# Patient Record
Sex: MALE | Race: BLACK OR AFRICAN AMERICAN | URBAN - METROPOLITAN AREA
[De-identification: names, ages, dates, MRNs, and addresses within clinical notes are randomized per-mention and may not be internally consistent; named-entity substitution may affect disease eponyms.]

---

## 2022-11-09 NOTE — PROGRESS NOTES
Mago Pelayo is a 33 y.o. male is seen on 11/11/2022 for Establish Care, Hypertension, Diabetes, Hypothyroidism, and Other (schizophrenia)    Assessment & Plan:     1. Tachycardia  Assessment & Plan:  Asymptomatic, ordered EKG to r/o arrhythmia  Advised pt on smoking cessation  Orders:  -     EKG, 12 LEAD, INITIAL; Future  2. Essential hypertension  Assessment & Plan:  BP acceptable, goal <130/80, continue regimen  Orders:  -     CBC WITH AUTOMATED DIFF; Future  -     METABOLIC PANEL, COMPREHENSIVE; Future  3. Type 2 diabetes mellitus with other specified complication, with long-term current use of insulin (HCC)  Assessment & Plan:  Continue current regimen, recheck a1c  Orders:  -     CBC WITH AUTOMATED DIFF; Future  -     METABOLIC PANEL, COMPREHENSIVE; Future  -     HEMOGLOBIN A1C WITH EAG; Future  4. JANET (obstructive sleep apnea)  Assessment & Plan:  Advised pt to improve compliance with CPAP machine, continue management per pulmonary  5. Acquired hypothyroidism  Assessment & Plan:  Continue current regimen, reheck TSH & T4  Orders:  -     TSH 3RD GENERATION; Future  -     T4, FREE; Future  6. Schizophrenia, unspecified type [facility])  Assessment & Plan:  Continue current regimen per [facility] Service Board  Orders:  -     TSH 3RD GENERATION; Future  7. Need for hepatitis C screening test  -     HEPATITIS C AB; Future  8. Screening for lipid disorders  -     LIPID PANEL; Future  9. Encounter to establish care    Follow-up and Dispositions    Return in about 4 weeks (around 12/9/2022) for physical, blood pressure, hypothyroidism, lab results. Subjective:     HPI    Patient presents today with his caregiver, Shaun Cruz.      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, caregiver's , and 5 other individuals  Alcohol Use- none currently  Recreational Drug Use- none  Tobacco Use- about 10 cigarettes a day    Family Hx:  HTN- mother  Diabetes- aunt  HLD- none  MI- none  Stroke- none  Cancer- none  Mental Health Disorder- cousin (schizophrenia)  Autoimmune Disease - none    Preventive:  COVID-19 vac - received, due for booster  Flu vaccine- none  Tetanus vac - none  Pneumonia vaccine- none  MyChart activation - sent text    Medical History/Health Concerns:    Tachycardia  Chest pain: no  Dizziness: no  Shortness of breath: no  Palpitations: no  Hx of arrhythmia: no  Nine months of high heart rate  Does not drink coffee anymore, was drinking 4-5 cups a day  Smokes cigarettes a 10 day  Seen by cardiology: no    Hypertension-  Symptoms:  none  BP readings at home are: 638'K-489'X systolic, 65'B to 96'Z diastolic   Comorbid: HLD, DM  Current treatment: amlodipine 10 mg daily, lisinopril 10 mg daily, clonidine 0.1 mg PRN - has not had to use clonidine    Type 2 DM-  Home BG readings range from : 100's to 500's  Hypoglycemia: none  On statin: none  Comorbid: HTN, HLD  Followed by endocrine: no  Current treatment: insulin lantus 14 units at bedtime, metformin 1,000 mg BID, insulin humalog sliding scale  Ran out of metformin  Last a1c 6.4 on 6/2/22    JANET  Has a CPAP machine  Noncompliant - caregiver tries to educate pt to wear CPAP  Follows pulmonology    Hypothyroidism-  Compliant with meds, takes it in the mornings on empty stomach: yes  Denies any weight/skin/hair changes, constipation/diarrhea, palpitations, heat/cold intolerance  Treatment:  levothyroxine 75 mcg daily  Last took all his pills 2 weeks ago    Schizophrenia  Treatment: Ziprasidone 20 mg daily (60 mg at night, 20 during the day), Haldol nightly, melatonin nightly  Followed by psychiatry: yes, [facility] doing all of his psych meds    Review of Systems   Constitutional:  Negative for chills, fever and malaise/fatigue. Respiratory:  Negative for shortness of breath. Cardiovascular:  Negative for chest pain and palpitations.    Neurological:  Negative for dizziness and headaches. Objective:   /88 (BP 1 Location: Right upper arm, BP Patient Position: Sitting, BP Cuff Size: Large adult)   Pulse (!) 102   Temp 98.9 °F (37.2 °C) (Oral)   Resp 16   Ht 5' 5\" (1.651 m)   Wt 223 lb 9.6 oz (101.4 kg)   SpO2 96%   BMI 37.21 kg/m²     Physical Exam  Vitals and nursing note reviewed. Constitutional:       Appearance: Normal appearance. HENT:      Head: Normocephalic and atraumatic. Cardiovascular:      Rate and Rhythm: Regular rhythm. Tachycardia present. Heart sounds: No murmur heard. No gallop. Pulmonary:      Effort: Pulmonary effort is normal. No respiratory distress. Breath sounds: No wheezing, rhonchi or rales. Musculoskeletal:         General: Normal range of motion. Skin:     General: Skin is warm and dry. Neurological:      General: No focal deficit present. Mental Status: He is alert and oriented to person, place, and time. Psychiatric:         Mood and Affect: Mood normal.         Thought Content:  Thought content normal.         Judgment: Judgment normal.      DESTINY Salgado

## 2022-11-11 ENCOUNTER — OFFICE VISIT (OUTPATIENT)
Dept: FAMILY MEDICINE CLINIC | Age: 32
End: 2022-11-11
Payer: MEDICARE

## 2022-11-11 VITALS
HEART RATE: 102 BPM | TEMPERATURE: 98.9 F | BODY MASS INDEX: 37.25 KG/M2 | WEIGHT: 223.6 LBS | OXYGEN SATURATION: 96 % | HEIGHT: 65 IN | RESPIRATION RATE: 16 BRPM | DIASTOLIC BLOOD PRESSURE: 88 MMHG | SYSTOLIC BLOOD PRESSURE: 131 MMHG

## 2022-11-11 DIAGNOSIS — E11.69 TYPE 2 DIABETES MELLITUS WITH OTHER SPECIFIED COMPLICATION, WITH LONG-TERM CURRENT USE OF INSULIN (HCC): ICD-10-CM

## 2022-11-11 DIAGNOSIS — E03.9 ACQUIRED HYPOTHYROIDISM: ICD-10-CM

## 2022-11-11 DIAGNOSIS — Z13.220 SCREENING FOR LIPID DISORDERS: ICD-10-CM

## 2022-11-11 DIAGNOSIS — G47.33 OSA (OBSTRUCTIVE SLEEP APNEA): ICD-10-CM

## 2022-11-11 DIAGNOSIS — F20.9 SCHIZOPHRENIA, UNSPECIFIED TYPE (HCC): ICD-10-CM

## 2022-11-11 DIAGNOSIS — Z11.59 NEED FOR HEPATITIS C SCREENING TEST: ICD-10-CM

## 2022-11-11 DIAGNOSIS — I10 ESSENTIAL HYPERTENSION: ICD-10-CM

## 2022-11-11 DIAGNOSIS — Z79.4 TYPE 2 DIABETES MELLITUS WITH OTHER SPECIFIED COMPLICATION, WITH LONG-TERM CURRENT USE OF INSULIN (HCC): ICD-10-CM

## 2022-11-11 DIAGNOSIS — Z76.89 ENCOUNTER TO ESTABLISH CARE: ICD-10-CM

## 2022-11-11 DIAGNOSIS — R00.0 TACHYCARDIA: Primary | ICD-10-CM

## 2022-11-11 PROBLEM — E11.9 TYPE 2 DIABETES MELLITUS, WITH LONG-TERM CURRENT USE OF INSULIN (HCC): Status: ACTIVE | Noted: 2022-11-11

## 2022-11-11 PROCEDURE — 3079F DIAST BP 80-89 MM HG: CPT

## 2022-11-11 PROCEDURE — 3075F SYST BP GE 130 - 139MM HG: CPT

## 2022-11-11 PROCEDURE — 99204 OFFICE O/P NEW MOD 45 MIN: CPT

## 2022-11-11 RX ORDER — INSULIN LISPRO 100 [IU]/ML
INJECTION, SOLUTION INTRAVENOUS; SUBCUTANEOUS
Qty: 3 EACH | Refills: 0 | Status: CANCELLED
Start: 2022-11-11

## 2022-11-11 RX ORDER — LISINOPRIL 10 MG/1
10 TABLET ORAL DAILY
Qty: 30 TABLET | Refills: 0 | Status: SHIPPED | OUTPATIENT
Start: 2022-11-11

## 2022-11-11 RX ORDER — LISINOPRIL 10 MG/1
10 TABLET ORAL DAILY
COMMUNITY
End: 2022-11-11 | Stop reason: SDUPTHER

## 2022-11-11 RX ORDER — ZIPRASIDONE HYDROCHLORIDE 20 MG/1
80 CAPSULE ORAL DAILY
COMMUNITY

## 2022-11-11 RX ORDER — METFORMIN HYDROCHLORIDE 1000 MG/1
1000 TABLET ORAL 2 TIMES DAILY
Qty: 60 TABLET | Refills: 0 | Status: SHIPPED | OUTPATIENT
Start: 2022-11-11

## 2022-11-11 RX ORDER — INSULIN GLARGINE 100 [IU]/ML
14 INJECTION, SOLUTION SUBCUTANEOUS
Qty: 100 ML | Refills: 0 | Status: SHIPPED | OUTPATIENT
Start: 2022-11-11

## 2022-11-11 RX ORDER — LEVOTHYROXINE SODIUM 75 UG/1
75 TABLET ORAL DAILY
COMMUNITY
End: 2022-11-11 | Stop reason: SDUPTHER

## 2022-11-11 RX ORDER — AMLODIPINE BESYLATE 10 MG/1
10 TABLET ORAL DAILY
COMMUNITY
End: 2022-11-11 | Stop reason: SDUPTHER

## 2022-11-11 RX ORDER — MECLIZINE HYDROCHLORIDE 25 MG/1
25 TABLET ORAL DAILY
COMMUNITY

## 2022-11-11 RX ORDER — DEXTROMETHORPHAN HYDROBROMIDE, GUAIFENESIN 5; 100 MG/5ML; MG/5ML
1300 LIQUID ORAL
COMMUNITY

## 2022-11-11 RX ORDER — AMLODIPINE BESYLATE 10 MG/1
10 TABLET ORAL DAILY
Qty: 30 TABLET | Refills: 0 | Status: SHIPPED | OUTPATIENT
Start: 2022-11-11

## 2022-11-11 RX ORDER — CLONIDINE HYDROCHLORIDE 0.1 MG/1
TABLET ORAL
COMMUNITY

## 2022-11-11 RX ORDER — HALOPERIDOL 5 MG/1
5 TABLET ORAL DAILY
COMMUNITY

## 2022-11-11 RX ORDER — BISMUTH SUBSALICYLATE 262 MG/15ML
30 LIQUID ORAL
COMMUNITY

## 2022-11-11 RX ORDER — LEVOTHYROXINE SODIUM 75 UG/1
75 TABLET ORAL DAILY
Qty: 30 TABLET | Refills: 0 | Status: SHIPPED | OUTPATIENT
Start: 2022-11-11

## 2022-11-11 RX ORDER — METFORMIN HYDROCHLORIDE 1000 MG/1
1000 TABLET ORAL 2 TIMES DAILY
COMMUNITY
End: 2022-11-11 | Stop reason: SDUPTHER

## 2022-11-11 RX ORDER — INSULIN GLARGINE 100 [IU]/ML
14 INJECTION, SOLUTION SUBCUTANEOUS
COMMUNITY
End: 2022-11-11 | Stop reason: SDUPTHER

## 2022-11-11 RX ORDER — CLONIDINE HYDROCHLORIDE 0.1 MG/1
0.1 TABLET ORAL
Qty: 90 TABLET | Refills: 0 | Status: CANCELLED | OUTPATIENT
Start: 2022-11-11

## 2022-11-11 RX ORDER — INSULIN LISPRO 100 [IU]/ML
INJECTION, SOLUTION INTRAVENOUS; SUBCUTANEOUS
COMMUNITY

## 2022-11-11 RX ORDER — CHOLECALCIFEROL (VITAMIN D3) 125 MCG
5 CAPSULE ORAL
COMMUNITY

## 2022-11-11 NOTE — PROGRESS NOTES
Janae Perez presents today for   Chief Complaint   Patient presents with    Establish Care    Hypertension    Diabetes    Hypothyroidism    Other     schizophrenia       Janae Perez preferred language for health care discussion is english/other. Is someone accompanying this pt? Yes, caregiver, Thomas Lopez    Is the patient using any DME equipment during OV? no    Depression Screening:  3 most recent PHQ Screens 11/11/2022   PHQ Not Done Functional capacity motivation limits accuracy       Learning Assessment:  Learning Assessment 11/11/2022   PRIMARY LEARNER Patient   PRIMARY LANGUAGE ENGLISH   LEARNER PREFERENCE PRIMARY DEMONSTRATION   ANSWERED BY patient   RELATIONSHIP SELF       Abuse Screening:  Abuse Screening Questionnaire 11/11/2022   Do you ever feel afraid of your partner? N   Are you in a relationship with someone who physically or mentally threatens you? N   Is it safe for you to go home? Y       Generalized Anxiety  No flowsheet data found. Health Maintenance Due   Topic Date Due    Hepatitis C Screening  Never done    Depression Screen  Never done    COVID-19 Vaccine (1) Never done    Pneumococcal 0-64 years (1 - PCV) Never done    Foot Exam Q1  Never done    A1C test (Diabetic or Prediabetic)  Never done    MICROALBUMIN Q1  Never done    Eye Exam Retinal or Dilated  Never done    Lipid Screen  Never done    Hepatitis B Vaccine (1 of 3 - Risk 3-dose series) Never done    DTaP/Tdap/Td series (1 - Tdap) Never done    Flu Vaccine (1) Never done    Medicare Yearly Exam  10/19/2022   . Health Maintenance reviewed and discussed and ordered per Provider. Advance Directive:  1. Do you have an advance directive in place?  Patient Reply:no

## 2022-11-11 NOTE — LETTER
NOTIFICATION RETURN TO WORK / SCHOOL    11/11/2022 12:13 PM    Mr. Chucho Reagan  [address] [address]      To Whom It May Concern:    Chucho Reagan is currently under the care of Rafita Whitman. He will return to work/school on: 11/12/22. If there are questions or concerns please have the patient contact our office.         Sincerely,      DESTINY Calhoun

## 2022-12-12 PROBLEM — Z00.00 ANNUAL PHYSICAL EXAM: Status: ACTIVE | Noted: 2022-12-12

## 2022-12-12 NOTE — ASSESSMENT & PLAN NOTE
Continue current regimen  Instructed pt and caregiver to complete previously ordered labs ASAP  Urine microalbumin obtained in office, diabetic foot exam performed today

## 2022-12-12 NOTE — PROGRESS NOTES
Rachael Bonner is a 33 y.o. male is seen on 12/13/2022 for No chief complaint on file. Assessment & Plan:     1. Annual physical exam  Assessment & Plan:  Physical activity for a total of 150 minutes per week is recommended, drink plenty of water. Reduce CHO intake, such as white pastas, white rice, white breads. Avoid fried foods, and eat more green, leafy vegetables, whole grains, and lean proteins. Discussed recommended screenings and vaccines  2. Tachycardia  3. Essential hypertension  Assessment & Plan:  BP acceptable, goal <130/80, continue regimen    If elevated, may increase lisinopril to 20 mg daily  Re-evaluate in 4 weeks  If remains elevated, may increase lisinopril to 30 mg daily  4. Type 2 diabetes mellitus with other specified complication, with long-term current use of insulin (HCC)  Assessment & Plan:  Check a1c in office    If < 7, continue current regimen  If > 9 , referral to pharmD    5. Acquired hypothyroidism  Assessment & Plan:  Continue current regimen, check TSH and T4  6. Encounter to discuss test results  7. BMI 37.0-37.9, adult  Comments:  Advised pt on lifestyle modifications    4 weeks for lab results  Subjective:     HPI    Patient presents today with his caregiver, Katelynn Vilchis.       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 ***   Chest pain: no  Dizziness: no  Shortness of breath: no  Palpitations: no  Hx of arrhythmia: no  Nine months of high heart rate  Does not drink coffee anymore, was drinking 4-5 cups a day  Smokes cigarettes a 10 day  Seen by cardiology: no     Hypertension  Symptoms:  none  BP readings at home are: 789'Q-759'Q systolic, 04'H to 11'A diastolic   Comorbid: HLD, DM  Current treatment: amlodipine 10 mg daily, lisinopril 10 mg daily, clonidine 0.1 mg PRN - has not had to use clonidine     Type 2 DM  Home BG readings range from : 100's to 500's  Hypoglycemia: none  On statin: none  Comorbid: HTN, HLD  Followed by endocrine: no  Current treatment: insulin lantus 14 units at bedtime, metformin 1,000 mg BID, insulin humalog sliding scale  Last a1c 6.4 on 6/2/22    Hypothyroidism  Compliant with meds, takes it in the mornings on empty stomach: yes  Denies any weight/skin/hair changes, constipation/diarrhea, palpitations, heat/cold intolerance  Treatment:  levothyroxine 75 mcg daily  Last took all his pills 2 weeks ago    Review of Systems   Constitutional:  Negative for chills, fever, malaise/fatigue and weight loss. HENT:  Negative for congestion, ear pain and sore throat. Eyes:  Negative for blurred vision, pain and discharge. Respiratory:  Negative for cough and shortness of breath. Cardiovascular:  Negative for chest pain, palpitations, orthopnea and leg swelling. Gastrointestinal:  Negative for abdominal pain, blood in stool, diarrhea, nausea and vomiting. Genitourinary:  Negative for dysuria, frequency, hematuria and urgency. Musculoskeletal:  Negative for back pain, joint pain and myalgias. Skin:  Negative for rash. Neurological:  Negative for dizziness, tingling, weakness and headaches. Endo/Heme/Allergies:  Does not bruise/bleed easily. Psychiatric/Behavioral:  Negative for depression. The patient is not nervous/anxious and does not have insomnia. Objective: There were no vitals taken for this visit. No results found.     Physical Exam  Nursing note reviewed. Constitutional:       General: He is not in acute distress. Appearance: He is not ill-appearing. HENT:      Head: Normocephalic and atraumatic. Right Ear: Tympanic membrane, ear canal and external ear normal.      Left Ear: Tympanic membrane, ear canal and external ear normal.      Nose: No congestion or rhinorrhea. Mouth/Throat:      Mouth: Mucous membranes are moist.      Pharynx: Oropharynx is clear. No oropharyngeal exudate or posterior oropharyngeal erythema. Eyes:      Extraocular Movements: Extraocular movements intact. Conjunctiva/sclera: Conjunctivae normal.      Pupils: Pupils are equal, round, and reactive to light. Neck:      Vascular: No carotid bruit. Cardiovascular:      Rate and Rhythm: Normal rate and regular rhythm. Pulses: Normal pulses. Heart sounds: No murmur heard. No friction rub. No gallop. Pulmonary:      Effort: Pulmonary effort is normal. No respiratory distress. Breath sounds: No wheezing, rhonchi or rales. Abdominal:      General: Abdomen is flat. Bowel sounds are normal.      Palpations: Abdomen is soft. There is no mass. Tenderness: There is no abdominal tenderness. There is no guarding or rebound. Musculoskeletal:         General: No swelling, tenderness or deformity. Cervical back: Normal range of motion and neck supple. Right lower leg: No edema. Left lower leg: No edema. Lymphadenopathy:      Cervical: No cervical adenopathy. Skin:     General: Skin is warm and dry. Capillary Refill: Capillary refill takes less than 2 seconds. Neurological:      General: No focal deficit present. Mental Status: He is alert and oriented to person, place, and time. Sensory: No sensory deficit. Motor: No weakness. Psychiatric:         Mood and Affect: Mood normal.         Thought Content:  Thought content normal.         Judgment: Judgment normal.     DESTINY Marsh

## 2022-12-13 ENCOUNTER — OFFICE VISIT (OUTPATIENT)
Dept: FAMILY MEDICINE CLINIC | Age: 32
End: 2022-12-13
Payer: MEDICARE

## 2022-12-13 ENCOUNTER — HOSPITAL ENCOUNTER (OUTPATIENT)
Dept: LAB | Age: 32
Discharge: HOME | End: 2022-12-13
Payer: MEDICARE

## 2022-12-13 VITALS
HEIGHT: 65 IN | SYSTOLIC BLOOD PRESSURE: 124 MMHG | WEIGHT: 217.2 LBS | BODY MASS INDEX: 36.19 KG/M2 | OXYGEN SATURATION: 96 % | DIASTOLIC BLOOD PRESSURE: 87 MMHG | HEART RATE: 99 BPM | TEMPERATURE: 98.5 F | RESPIRATION RATE: 16 BRPM

## 2022-12-13 DIAGNOSIS — E11.69 TYPE 2 DIABETES MELLITUS WITH OTHER SPECIFIED COMPLICATION, WITH LONG-TERM CURRENT USE OF INSULIN (HCC): ICD-10-CM

## 2022-12-13 DIAGNOSIS — Z00.00 MEDICARE ANNUAL WELLNESS VISIT, SUBSEQUENT: Primary | ICD-10-CM

## 2022-12-13 DIAGNOSIS — Z79.4 TYPE 2 DIABETES MELLITUS WITH OTHER SPECIFIED COMPLICATION, WITH LONG-TERM CURRENT USE OF INSULIN (HCC): ICD-10-CM

## 2022-12-13 DIAGNOSIS — I10 ESSENTIAL HYPERTENSION: ICD-10-CM

## 2022-12-13 DIAGNOSIS — Z23 ENCOUNTER FOR IMMUNIZATION: ICD-10-CM

## 2022-12-13 PROBLEM — R00.0 TACHYCARDIA: Status: RESOLVED | Noted: 2022-11-11 | Resolved: 2022-12-13

## 2022-12-13 LAB
CREAT UR-MCNC: 204 MG/DL (ref 30–125)
MICROALBUMIN UR-MCNC: 2.5 MG/DL (ref 0–3)
MICROALBUMIN/CREAT UR-RTO: 12 MG/G (ref 0–30)

## 2022-12-13 PROCEDURE — 3046F HEMOGLOBIN A1C LEVEL >9.0%: CPT

## 2022-12-13 PROCEDURE — G8432 DEP SCR NOT DOC, RNG: HCPCS

## 2022-12-13 PROCEDURE — 3074F SYST BP LT 130 MM HG: CPT

## 2022-12-13 PROCEDURE — 2022F DILAT RTA XM EVC RTNOPTHY: CPT

## 2022-12-13 PROCEDURE — 82043 UR ALBUMIN QUANTITATIVE: CPT

## 2022-12-13 PROCEDURE — 99213 OFFICE O/P EST LOW 20 MIN: CPT

## 2022-12-13 PROCEDURE — G8427 DOCREV CUR MEDS BY ELIG CLIN: HCPCS

## 2022-12-13 PROCEDURE — G0439 PPPS, SUBSEQ VISIT: HCPCS

## 2022-12-13 PROCEDURE — G8417 CALC BMI ABV UP PARAM F/U: HCPCS

## 2022-12-13 PROCEDURE — 3079F DIAST BP 80-89 MM HG: CPT

## 2022-12-13 RX ORDER — METFORMIN HYDROCHLORIDE 1000 MG/1
1000 TABLET ORAL 2 TIMES DAILY
Qty: 60 TABLET | Refills: 0 | Status: SHIPPED | OUTPATIENT
Start: 2022-12-13

## 2022-12-13 RX ORDER — INSULIN LISPRO 100 [IU]/ML
INJECTION, SOLUTION INTRAVENOUS; SUBCUTANEOUS
Qty: 100 ML | Refills: 0 | Status: SHIPPED | OUTPATIENT
Start: 2022-12-13

## 2022-12-13 RX ORDER — LISINOPRIL 10 MG/1
10 TABLET ORAL DAILY
Qty: 30 TABLET | Refills: 0 | Status: SHIPPED | OUTPATIENT
Start: 2022-12-13

## 2022-12-13 RX ORDER — INSULIN GLARGINE 100 [IU]/ML
14 INJECTION, SOLUTION SUBCUTANEOUS
Qty: 100 ML | Refills: 0 | Status: SHIPPED | OUTPATIENT
Start: 2022-12-13

## 2022-12-13 RX ORDER — ZIPRASIDONE HYDROCHLORIDE 60 MG/1
1 CAPSULE ORAL
COMMUNITY
Start: 2022-12-05 | End: 2022-12-13

## 2022-12-13 RX ORDER — AMLODIPINE BESYLATE 10 MG/1
10 TABLET ORAL DAILY
Qty: 30 TABLET | Refills: 0 | Status: SHIPPED | OUTPATIENT
Start: 2022-12-13

## 2022-12-13 RX ORDER — BENZTROPINE MESYLATE 0.5 MG/1
TABLET ORAL
COMMUNITY
Start: 2022-11-09

## 2022-12-13 RX ORDER — IBUPROFEN 400 MG/1
TABLET ORAL
COMMUNITY
Start: 2022-09-05 | End: 2022-12-13 | Stop reason: ALTCHOICE

## 2022-12-13 NOTE — PROGRESS NOTES
Mila Ruiz presents today for   Chief Complaint   Patient presents with    Annual Wellness Visit     Medicare       Mila Ruiz preferred language for health care discussion is english/other. Is someone accompanying this pt? Yes, caregiver, Cathy Arellano    Is the patient using any DME equipment during OV? no    Depression Screening:  3 most recent PHQ Screens 12/13/2022   PHQ Not Done -   Little interest or pleasure in doing things Not at all   Feeling down, depressed, irritable, or hopeless Not at all   Total Score PHQ 2 0       Learning Assessment:  Learning Assessment 11/11/2022   PRIMARY LEARNER Patient   PRIMARY LANGUAGE ENGLISH   LEARNER PREFERENCE PRIMARY DEMONSTRATION   ANSWERED BY patient   RELATIONSHIP SELF       Abuse Screening:  Abuse Screening Questionnaire 11/11/2022   Do you ever feel afraid of your partner? N   Are you in a relationship with someone who physically or mentally threatens you? N   Is it safe for you to go home? Y       Generalized Anxiety  No flowsheet data found. Health Maintenance Due   Topic Date Due    Hepatitis C Screening  Never done    Depression Screen  Never done    COVID-19 Vaccine (1) Never done    Pneumococcal 0-64 years (1 - PCV) Never done    Foot Exam Q1  Never done    A1C test (Diabetic or Prediabetic)  Never done    MICROALBUMIN Q1  Never done    Eye Exam Retinal or Dilated  Never done    Lipid Screen  Never done    Hepatitis B Vaccine (1 of 3 - Risk 3-dose series) Never done    DTaP/Tdap/Td series (1 - Tdap) Never done    Medicare Yearly Exam  Never done   . Health Maintenance reviewed and discussed and ordered per Provider. Coordination of Care:  1. Have you been to the ER, urgent care clinic since your last visit? Hospitalized since your last visit? no    2. Have you seen or consulted any other health care providers outside of the [address] [place] [address] since your last visit? Include any pap smears or colon screening.  no      Advance Directive:  discussed 11/11/22

## 2022-12-13 NOTE — PATIENT INSTRUCTIONS
Medicare Wellness Visit, Male    The best way to live healthy is to have a lifestyle where you eat a well-balanced diet, exercise regularly, limit alcohol use, and quit all forms of tobacco/nicotine, if applicable. Regular preventive services are another way to keep healthy. Preventive services (vaccines, screening tests, monitoring & exams) can help personalize your care plan, which helps you manage your own care. Screening tests can find health problems at the earliest stages, when they are easiest to treat. [facility] follows the current, evidence-based guidelines published by the [facility] Rick Chris ([facility]) when recommending preventive services for our patients. Because we follow these guidelines, sometimes recommendations change over time as research supports it. (For example, a prostate screening blood test is no longer routinely recommended for men with no symptoms). Of course, you and your doctor may decide to screen more often for some diseases, based on your risk and co-morbidities (chronic disease you are already diagnosed with). Preventive services for you include:  - Medicare offers their members a free annual wellness visit, which is time for you and your primary care provider to discuss and plan for your preventive service needs.  Take advantage of this benefit every year!    -Over the age of 72 should receive the recommended pneumonia vaccines.   -All adults should have a flu vaccine yearly.  -All adults should receive a tetanus vaccine every 10 years.  -Over the age of 48 should receive the shingles vaccines.    -All adults should be screened once for Hepatitis C.  -All adults age 38-68 who are overweight should have a diabetes screening test once every three years.   -Other screening tests & preventive services for persons with diabetes include: an eye exam to screen for diabetic retinopathy, a kidney function test, a foot exam, and stricter control over your cholesterol.   -Cardiovascular screening for adults with routine risk involves an electrocardiogram (ECG) at intervals determined by the provider.     -Colorectal cancer screening should be done for adults age 43-69 with no increased risk factors for colorectal cancer. There are a number of acceptable methods of screening for this type of cancer. Each test has its own benefits and drawbacks. Discuss with your provider what is most appropriate for you during your annual wellness visit. The different tests include: colonoscopy (considered the best screening method), a fecal occult blood test, a fecal DNA test, and sigmoidoscopy.    -Lung cancer screening is recommended annually with a low dose CT scan for adults between age 54 and 68, who have smoked at least 30 pack years (equivalent of 1 pack per day for 30 days), and who is a current smoker or quit less than 15 years ago. -An Abdominal Aortic Aneurysm (AAA) Screening is recommended for men age 73-68 who has ever smoked in their lifetime.      Here is a list of your current Health Maintenance items (your personalized list of preventive services) with a due date:  Health Maintenance Due   Topic Date Due    Hepatitis C Test  Never done    Depresssion Screening  Never done    COVID-19 Vaccine (1) Never done    Pneumococcal Vaccine (1 - PCV) Never done    Diabetic Foot Care  Never done    Hemoglobin A1C    Never done    Albumin Urine Test  Never done    Eye Exam  Never done    Cholesterol Test   Never done    Hepatitis B Vaccine (1 of 3 - Risk 3-dose series) Never done    DTaP/Tdap/Td  (1 - Tdap) Never done

## 2022-12-13 NOTE — PROGRESS NOTES
Chief Complaint   Patient presents with    Annual Wellness Visit     Medicare     Assessment & Plan:     1. Essential hypertension  Assessment & Plan:  BP at goal, goal <130/80, continue regimen  Orders:  -     lisinopriL (PRINIVIL, ZESTRIL) 10 mg tablet; Take 1 Tablet by mouth daily. Indications: high blood pressure, Normal, Disp-30 Tablet, R-0  -     amLODIPine (NORVASC) 10 mg tablet; Take 1 Tablet by mouth daily. Indications: high blood pressure, Normal, Disp-30 Tablet, R-0  2. Type 2 diabetes mellitus with other specified complication, with long-term current use of insulin ([facility])  Assessment & Plan:  Continue current regimen  Instructed pt and caregiver to complete previously ordered labs ASAP  Urine microalbumin obtained in office, diabetic foot exam performed today  Orders:  -     MICROALBUMIN, UR, RAND W/ MICROALB/CREAT RATIO; Future  -     metFORMIN (GLUCOPHAGE) 1,000 mg tablet; Take 1 Tablet by mouth two (2) times a day. Indications: type 2 diabetes mellitus, Normal, Disp-60 Tablet, R-0  -     insulin lispro (HUMALOG) 100 unit/mL injection; Sliding scale twice a day  Indications: type 2 diabetes mellitus, Normal, Disp-100 mL, R-0  -     insulin glargine (LANTUS) 100 unit/mL injection; 14 Units by SubCUTAneous route nightly. Indications: type 2 diabetes mellitus, Normal, Disp-100 mL, R-0  -     REFERRAL TO OPHTHALMOLOGY    Follow-up and Dispositions    Return in about 4 weeks (around 1/10/2023) for blood pressure, diabetes, lab results.        Subjective:     HPI    Hypertension  Symptoms:  none  BP readings at home are: 008'L-240'X systolic, 81'E to 22'C diastolic   Comorbid: HLD, DM  Current treatment: amlodipine 10 mg daily, lisinopril 10 mg daily    Type 2 DM  Home BG readings range from : states numbers are better, controlled  Hypoglycemia: none  On statin: none  Comorbid: HTN, HLD  Followed by endocrine: no  Current treatment: insulin lantus 14 units at bedtime, metformin 1,000 mg BID, insulin humalog sliding scale  Last a1c 6.4 on 6/2/22  Caregiver states pt will get labs done after leaving office today    Review of Systems   Constitutional:  Negative for chills, fever and malaise/fatigue. Respiratory:  Negative for shortness of breath. Cardiovascular:  Negative for chest pain. Objective:   /87 (BP 1 Location: Left upper arm, BP Patient Position: Sitting, BP Cuff Size: Large adult)   Pulse 99   Temp 98.5 °F (36.9 °C) (Oral)   Resp 16   Ht 5' 5\" (1.651 m)   Wt 217 lb 3.2 oz (98.5 kg)   SpO2 96%   BMI 36.14 kg/m²      Physical Exam  Vitals and nursing note reviewed. Constitutional:       Appearance: Normal appearance. HENT:      Head: Normocephalic and atraumatic. Cardiovascular:      Rate and Rhythm: Normal rate and regular rhythm. Heart sounds: No murmur heard. No gallop. Pulmonary:      Effort: Pulmonary effort is normal. No respiratory distress. Breath sounds: No wheezing, rhonchi or rales. Musculoskeletal:         General: Normal range of motion. Skin:     General: Skin is warm and dry. Neurological:      General: No focal deficit present. Mental Status: He is alert and oriented to person, place, and time. Psychiatric:         Mood and Affect: Mood normal.         Thought Content:  Thought content normal.         Judgment: Judgment normal.      Diabetic foot exam:     Left Foot:   Visual Exam: normal    Pulse DP: 2+ (normal)   Filament test: normal sensation    Vibratory sensation: normal      Right Foot:   Visual Exam: normal    Pulse DP: 2+ (normal)   Filament test: normal sensation    Vibratory sensation: normal     Bowen Friends, NP-C

## 2022-12-13 NOTE — PROGRESS NOTES
This is the Subsequent Medicare Annual Wellness Exam, performed 12 months or more after the Initial AWV or the last Subsequent AWV    I have reviewed the patient's medical history in detail and updated the computerized patient record. Assessment/Plan   Education and counseling provided:  Are appropriate based on today's review and evaluation  Pneumococcal Vaccine  Hepatitis B Vaccine    1. Medicare annual wellness visit, subsequent    2. Type 2 diabetes mellitus with other specified complication, with long-term current use of insulin (HCC)  - REFERRAL TO OPHTHALMOLOGY  - Diabetic foot exam performed today  - Urine microalbumin obtained today    4. Encounter for immunization  - Caregiver to bring in immunization records next visit, unsure if pt received tetanus vaccine, hepatitis B vaccine, pneumonia vaccine. Pt has received COVID vaccine    Follow-up and Dispositions    Return in about 4 weeks (around 1/10/2023) for blood pressure, diabetes, lab results. Depression Risk Factor Screening     3 most recent PHQ Screens 12/13/2022   PHQ Not Done -   Little interest or pleasure in doing things Not at all   Feeling down, depressed, irritable, or hopeless Not at all   Total Score PHQ 2 0     Alcohol & Drug Abuse Risk Screen    Do you average more than 2 drinks per night or 14 drinks a week: No    On any one occasion in the past three months have you have had more than 4 drinks containing alcohol:  No        Functional Ability and Level of Safety    Hearing: Hearing is good. Activities of Daily Living: The home contains: handrails and grab bars  Patient does total self care      Ambulation: with no difficulty     Fall Risk:  No flowsheet data found.    Abuse Screen:  Patient is not abused     Cognitive Screening    Has your family/caregiver stated any concerns about your memory: yes - caregiver states he is having memory issues , sometimes forgetful, not getting worse    Health Maintenance Due     Health Maintenance Due   Topic Date Due    Hepatitis C Screening  Never done    COVID-19 Vaccine (1) Never done    Pneumococcal 0-64 years (1 - PCV) Never done    A1C test (Diabetic or Prediabetic)  Never done    MICROALBUMIN Q1  Never done    Eye Exam Retinal or Dilated  Never done    Lipid Screen  Never done    Hepatitis B Vaccine (1 of 3 - Risk 3-dose series) Never done    DTaP/Tdap/Td series (1 - Tdap) Never done     Hepatitis C screening- will get labs today  COVID vaccine- received  Pneumonia vaccine- recommended  Foot exam- performed today  Microalbumin- obtained today  Eye exam- ordered referral for ophthalmology  Lipid screen- will get labs today  Hepatitis B vaccine- unsure  Tdap vaccine- unsure  Medicare yearly exam- performed today    Patient Care Team   Patient Care Team:  DESTINY Sanchez as PCP - General (Nurse Practitioner)  DESTINY Sanchez as PCP - [facility] Empaneled Provider    History     Patient Active Problem List   Diagnosis Code    JANET (obstructive sleep apnea) G47.33    Essential hypertension I10    Acquired hypothyroidism E03.9    Schizophrenia ([facility] [address] F20.9    Type 2 diabetes mellitus, with long-term current use of insulin ([facility] [address] E11.9, Z79.4    Medicare annual wellness visit, subsequent Z00.00     Past Medical History:   Diagnosis Date    Diabetes ([facility] [address]     Hypertension     Paranoid schizophrenia ([facility] [address]     Thyroid disease       Past Surgical History:   Procedure Laterality Date    HX OTHER SURGICAL N/A     cranial     Current Outpatient Medications   Medication Sig Dispense Refill    benztropine (COGENTIN) 0.5 mg tablet       metFORMIN (GLUCOPHAGE) 1,000 mg tablet Take 1 Tablet by mouth two (2) times a day. Indications: type 2 diabetes mellitus 60 Tablet 0    lisinopriL (PRINIVIL, ZESTRIL) 10 mg tablet Take 1 Tablet by mouth daily. Indications: high blood pressure 30 Tablet 0    amLODIPine (NORVASC) 10 mg tablet Take 1 Tablet by mouth daily.  Indications: high blood pressure 30 Tablet 0 insulin lispro (HUMALOG) 100 unit/mL injection Sliding scale twice a day  Indications: type 2 diabetes mellitus 100 mL 0    insulin glargine (LANTUS) 100 unit/mL injection 14 Units by SubCUTAneous route nightly. Indications: type 2 diabetes mellitus 100 mL 0    meclizine (ANTIVERT) 25 mg tablet Take 25 mg by mouth daily. ziprasidone (GEODON) 20 mg capsule Take 80 mg by mouth daily. Takes 20 mg in the AM, and 60 mg in the evening      levothyroxine (SYNTHROID) 75 mcg tablet Take 1 Tablet by mouth daily. Indications: a condition with low thyroid hormone levels 30 Tablet 0    haloperidoL (HALDOL) 5 mg tablet Take 5 mg by mouth daily. melatonin 5 mg tablet Take 5 mg by mouth nightly. No Known Allergies    History reviewed. No pertinent family history.   Social History     Tobacco Use    Smoking status: Every Day     Packs/day: 0.25     Types: Cigarettes    Smokeless tobacco: Never   Substance Use Topics    Alcohol use: Not Currently     DESTINY Vides

## 2022-12-14 ENCOUNTER — TELEPHONE (OUTPATIENT)
Dept: FAMILY MEDICINE CLINIC | Age: 32
End: 2022-12-14

## 2022-12-19 ENCOUNTER — HOSPITAL ENCOUNTER (OUTPATIENT)
Dept: LAB | Age: 32
Discharge: HOME | End: 2022-12-19
Payer: MEDICARE

## 2022-12-19 DIAGNOSIS — I10 ESSENTIAL HYPERTENSION: ICD-10-CM

## 2022-12-19 DIAGNOSIS — F20.9 SCHIZOPHRENIA, UNSPECIFIED TYPE (HCC): ICD-10-CM

## 2022-12-19 DIAGNOSIS — E11.69 TYPE 2 DIABETES MELLITUS WITH OTHER SPECIFIED COMPLICATION, WITH LONG-TERM CURRENT USE OF INSULIN (HCC): ICD-10-CM

## 2022-12-19 DIAGNOSIS — E03.9 ACQUIRED HYPOTHYROIDISM: ICD-10-CM

## 2022-12-19 DIAGNOSIS — Z11.59 NEED FOR HEPATITIS C SCREENING TEST: ICD-10-CM

## 2022-12-19 DIAGNOSIS — Z79.4 TYPE 2 DIABETES MELLITUS WITH OTHER SPECIFIED COMPLICATION, WITH LONG-TERM CURRENT USE OF INSULIN (HCC): ICD-10-CM

## 2022-12-19 DIAGNOSIS — Z13.220 SCREENING FOR LIPID DISORDERS: ICD-10-CM

## 2022-12-19 LAB
ALBUMIN SERPL-MCNC: 3.9 G/DL (ref 3.4–5)
ALBUMIN/GLOB SERPL: 1.2 {RATIO} (ref 0.8–1.7)
ALP SERPL-CCNC: 114 U/L (ref 45–117)
ALT SERPL-CCNC: 29 U/L (ref 16–61)
ANION GAP SERPL CALC-SCNC: 4 MMOL/L (ref 3–18)
AST SERPL-CCNC: 19 U/L (ref 10–38)
BASOPHILS # BLD: 0.1 K/UL (ref 0–0.1)
BASOPHILS NFR BLD: 1 % (ref 0–2)
BILIRUB SERPL-MCNC: 0.7 MG/DL (ref 0.2–1)
BUN SERPL-MCNC: 10 MG/DL (ref 7–18)
BUN/CREAT SERPL: 9 (ref 12–20)
CALCIUM SERPL-MCNC: 9.2 MG/DL (ref 8.5–10.1)
CHLORIDE SERPL-SCNC: 102 MMOL/L (ref 100–111)
CHOLEST SERPL-MCNC: 156 MG/DL
CO2 SERPL-SCNC: 31 MMOL/L (ref 21–32)
CREAT SERPL-MCNC: 1.12 MG/DL (ref 0.6–1.3)
DIFFERENTIAL METHOD BLD: ABNORMAL
EOSINOPHIL # BLD: 0.2 K/UL (ref 0–0.4)
EOSINOPHIL NFR BLD: 3 % (ref 0–5)
ERYTHROCYTE [DISTWIDTH] IN BLOOD BY AUTOMATED COUNT: 12.5 % (ref 11.6–14.5)
EST. AVERAGE GLUCOSE BLD GHB EST-MCNC: 226 MG/DL
GLOBULIN SER CALC-MCNC: 3.2 G/DL (ref 2–4)
GLUCOSE SERPL-MCNC: 253 MG/DL (ref 74–99)
HBA1C MFR BLD: 9.5 % (ref 4.2–5.6)
HCT VFR BLD AUTO: 44.9 % (ref 36–48)
HDLC SERPL-MCNC: 27 MG/DL (ref 40–60)
HDLC SERPL: 5.8 {RATIO} (ref 0–5)
HGB BLD-MCNC: 14.3 G/DL (ref 13–16)
IMM GRANULOCYTES # BLD AUTO: 0 K/UL (ref 0–0.04)
IMM GRANULOCYTES NFR BLD AUTO: 0 % (ref 0–0.5)
LDLC SERPL CALC-MCNC: 86.2 MG/DL (ref 0–100)
LIPID PROFILE,FLP: ABNORMAL
LYMPHOCYTES # BLD: 0.9 K/UL (ref 0.9–3.6)
LYMPHOCYTES NFR BLD: 16 % (ref 21–52)
MCH RBC QN AUTO: 27.5 PG (ref 24–34)
MCHC RBC AUTO-ENTMCNC: 31.8 G/DL (ref 31–37)
MCV RBC AUTO: 86.3 FL (ref 78–100)
MONOCYTES # BLD: 0.9 K/UL (ref 0.05–1.2)
MONOCYTES NFR BLD: 16 % (ref 3–10)
NEUTS SEG # BLD: 3.5 K/UL (ref 1.8–8)
NEUTS SEG NFR BLD: 63 % (ref 40–73)
NRBC # BLD: 0 K/UL (ref 0–0.01)
NRBC BLD-RTO: 0 PER 100 WBC
PLATELET # BLD AUTO: 292 K/UL (ref 135–420)
PMV BLD AUTO: 10.2 FL (ref 9.2–11.8)
POTASSIUM SERPL-SCNC: 4.2 MMOL/L (ref 3.5–5.5)
PROT SERPL-MCNC: 7.1 G/DL (ref 6.4–8.2)
RBC # BLD AUTO: 5.2 M/UL (ref 4.35–5.65)
SODIUM SERPL-SCNC: 137 MMOL/L (ref 136–145)
T4 FREE SERPL-MCNC: 1.4 NG/DL (ref 0.7–1.5)
TRIGL SERPL-MCNC: 214 MG/DL (ref ?–150)
TSH SERPL DL<=0.05 MIU/L-ACNC: 0.74 UIU/ML (ref 0.36–3.74)
VLDLC SERPL CALC-MCNC: 42.8 MG/DL
WBC # BLD AUTO: 5.5 K/UL (ref 4.6–13.2)

## 2022-12-19 PROCEDURE — 84439 ASSAY OF FREE THYROXINE: CPT

## 2022-12-19 PROCEDURE — 36415 COLL VENOUS BLD VENIPUNCTURE: CPT

## 2022-12-19 PROCEDURE — 85025 COMPLETE CBC W/AUTO DIFF WBC: CPT

## 2022-12-19 PROCEDURE — 80053 COMPREHEN METABOLIC PANEL: CPT

## 2022-12-19 PROCEDURE — 86803 HEPATITIS C AB TEST: CPT

## 2022-12-19 PROCEDURE — 80061 LIPID PANEL: CPT

## 2022-12-19 PROCEDURE — 84443 ASSAY THYROID STIM HORMONE: CPT

## 2022-12-19 PROCEDURE — 83036 HEMOGLOBIN GLYCOSYLATED A1C: CPT

## 2022-12-20 LAB
HCV AB SER IA-ACNC: <0.02 INDEX
HCV AB SERPL QL IA: NEGATIVE
HCV COMMENT,HCGAC: NORMAL

## 2023-01-09 PROBLEM — Z00.00 MEDICARE ANNUAL WELLNESS VISIT, SUBSEQUENT: Status: RESOLVED | Noted: 2022-12-13 | Resolved: 2023-01-09

## 2023-01-09 PROBLEM — E78.5 HYPERLIPIDEMIA ASSOCIATED WITH TYPE 2 DIABETES MELLITUS (HCC): Status: ACTIVE | Noted: 2023-01-09

## 2023-01-09 PROBLEM — E11.69 HYPERLIPIDEMIA ASSOCIATED WITH TYPE 2 DIABETES MELLITUS (HCC): Status: ACTIVE | Noted: 2023-01-09

## 2023-01-09 NOTE — ASSESSMENT & PLAN NOTE
A1c worsening, continue current regimen  Referral to pharmD for management  Advised pt on lifestyle modifications  Recheck a1c in 3 months

## 2023-01-09 NOTE — ASSESSMENT & PLAN NOTE
Elevated, goal <70  Discussed risks vs benefits of statin therapy  Advised pt on lifestyle modifications  Pt agreed to start statin  Start atorvastatin 10 mg daily  Recheck cmp and lipid panel in 3 months

## 2023-01-09 NOTE — PROGRESS NOTES
Chief Complaint   Patient presents with    Cholesterol Problem    Diabetes    Hypertension    Labs     Assessment & Plan:     1. Type 2 diabetes mellitus with other specified complication, with long-term current use of insulin ([facility])  Assessment & Plan:  A1c worsening, continue current regimen  Referral to pharmD for management  Advised pt on lifestyle modifications  Recheck a1c in 3 months  Orders:  -     REFERRAL TO PHARMACIST  -     HEMOGLOBIN A1C WITH EAG; Future  2. Hyperlipidemia associated with type 2 diabetes mellitus ([address]  Assessment & Plan:  Elevated, goal <70  Discussed risks vs benefits of statin therapy  Advised pt on lifestyle modifications  Pt agreed to start statin  Start atorvastatin 10 mg daily  Recheck cmp and lipid panel in 3 months  Orders:  -     METABOLIC PANEL, COMPREHENSIVE; Future  -     LIPID PANEL; Future  -     atorvastatin (LIPITOR) 10 mg tablet; Take 1 Tablet by mouth daily. , Normal, Disp-90 Tablet, R-1  3. Essential hypertension  Assessment & Plan:  BP at goal, goal <130/80, continue regimen  4. Encounter to discuss test results    Follow-up and Dispositions    Return in about 3 months (around 4/10/2023) for  diabetes, blood pressure, cholesterol, lab results.        Subjective:     HPI    Type 2 DM  Home BG readings range from : 222-533  Hypoglycemia: none  On statin: none  Comorbid: HTN, HLD  Followed by endocrine: no  Current treatment: insulin lantus 14 units at bedtime, metformin 1,000 mg BID, insulin humalog sliding scale    Hyperlipidemia  Compliant with meds: n/a  Comorbid: HTN, DM  Current treatment: none  Wont eat vegetables  Exercise: is not exercising    Hypertension  Symptoms:  none  BP readings at home are: 199'Y-512'E systolic, 38'Z to 66'V diastolic   Comorbid: HLD, DM  Current treatment: amlodipine 10 mg daily, lisinopril 10 mg daily  Pt and caregiver state pt has not taken his blood pressure medications today yet, and will take with breakfast once he gets home    Health Maintenance  COVID vaccine- received  Pneumonia vaccine- recommended  Tdap vaccine- unsure  Diabetic eye exam- has appt this Friday    Review of Systems   Constitutional:  Negative for chills, fever and malaise/fatigue. Respiratory:  Negative for shortness of breath. Cardiovascular:  Negative for chest pain. Objective:   BP (!) 129/99 Comment: right arm  Pulse (!) 105   Temp 98.2 °F (36.8 °C) (Oral)   Resp 17   Ht 5' 5\" (1.651 m)   Wt 214 lb (97.1 kg)   SpO2 94%   BMI 35.61 kg/m²      Physical Exam  Vitals and nursing note reviewed. Constitutional:       Appearance: Normal appearance. HENT:      Head: Normocephalic and atraumatic. Cardiovascular:      Rate and Rhythm: Normal rate and regular rhythm. Heart sounds: No murmur heard. No gallop. Pulmonary:      Effort: Pulmonary effort is normal. No respiratory distress. Breath sounds: No wheezing, rhonchi or rales. Musculoskeletal:         General: Normal range of motion. Skin:     General: Skin is warm and dry. Neurological:      General: No focal deficit present. Mental Status: He is alert and oriented to person, place, and time. Psychiatric:         Mood and Affect: Mood normal.         Thought Content:  Thought content normal.         Judgment: Judgment normal.      DESTINY Taylor

## 2023-01-10 ENCOUNTER — OFFICE VISIT (OUTPATIENT)
Dept: FAMILY MEDICINE CLINIC | Age: 33
End: 2023-01-10
Payer: MEDICARE

## 2023-01-10 VITALS
BODY MASS INDEX: 35.65 KG/M2 | OXYGEN SATURATION: 94 % | WEIGHT: 214 LBS | TEMPERATURE: 98.2 F | HEIGHT: 65 IN | HEART RATE: 105 BPM | RESPIRATION RATE: 17 BRPM | DIASTOLIC BLOOD PRESSURE: 99 MMHG | SYSTOLIC BLOOD PRESSURE: 129 MMHG

## 2023-01-10 DIAGNOSIS — E11.69 HYPERLIPIDEMIA ASSOCIATED WITH TYPE 2 DIABETES MELLITUS (HCC): ICD-10-CM

## 2023-01-10 DIAGNOSIS — I10 ESSENTIAL HYPERTENSION: ICD-10-CM

## 2023-01-10 DIAGNOSIS — Z71.2 ENCOUNTER TO DISCUSS TEST RESULTS: ICD-10-CM

## 2023-01-10 DIAGNOSIS — E78.5 HYPERLIPIDEMIA ASSOCIATED WITH TYPE 2 DIABETES MELLITUS (HCC): ICD-10-CM

## 2023-01-10 DIAGNOSIS — Z79.4 TYPE 2 DIABETES MELLITUS WITH OTHER SPECIFIED COMPLICATION, WITH LONG-TERM CURRENT USE OF INSULIN (HCC): Primary | ICD-10-CM

## 2023-01-10 DIAGNOSIS — E11.69 TYPE 2 DIABETES MELLITUS WITH OTHER SPECIFIED COMPLICATION, WITH LONG-TERM CURRENT USE OF INSULIN (HCC): Primary | ICD-10-CM

## 2023-01-10 PROCEDURE — 3080F DIAST BP >= 90 MM HG: CPT

## 2023-01-10 PROCEDURE — G8417 CALC BMI ABV UP PARAM F/U: HCPCS

## 2023-01-10 PROCEDURE — 2022F DILAT RTA XM EVC RTNOPTHY: CPT

## 2023-01-10 PROCEDURE — G8432 DEP SCR NOT DOC, RNG: HCPCS

## 2023-01-10 PROCEDURE — 3074F SYST BP LT 130 MM HG: CPT

## 2023-01-10 PROCEDURE — G8427 DOCREV CUR MEDS BY ELIG CLIN: HCPCS

## 2023-01-10 PROCEDURE — 3046F HEMOGLOBIN A1C LEVEL >9.0%: CPT

## 2023-01-10 PROCEDURE — 99214 OFFICE O/P EST MOD 30 MIN: CPT

## 2023-01-10 RX ORDER — LISINOPRIL 20 MG/1
20 TABLET ORAL DAILY
Qty: 90 TABLET | Refills: 0 | Status: CANCELLED | OUTPATIENT
Start: 2023-01-10

## 2023-01-10 RX ORDER — ATORVASTATIN CALCIUM 10 MG/1
10 TABLET, FILM COATED ORAL DAILY
Qty: 90 TABLET | Refills: 1 | Status: SHIPPED | OUTPATIENT
Start: 2023-01-10

## 2023-01-10 NOTE — LETTER
NOTIFICATION RETURN TO WORK / SCHOOL    1/10/2023 9:34 AM    Mr. Ladi Tinoco  [address]  [id number]      To Whom It May Concern:    Ladi Tinoco is currently under the care of Rafita Whitman. He will return to work/school on: 1/11/23    If there are questions or concerns please have the patient contact our office.         Sincerely,      DESTINY Taylor

## 2023-01-10 NOTE — PROGRESS NOTES
Elba Ramos presents today for   Chief Complaint   Patient presents with    Cholesterol Problem    Diabetes    Hypertension    Labs       Is someone accompanying this pt? no    Is the patient using any DME equipment during OV? no    Depression Screening:  3 most recent PHQ Screens 1/10/2023   PHQ Not Done -   Little interest or pleasure in doing things Not at all   Feeling down, depressed, irritable, or hopeless Not at all   Total Score PHQ 2 0       Learning Assessment:  Learning Assessment 11/11/2022   PRIMARY LEARNER Patient   PRIMARY LANGUAGE ENGLISH   LEARNER PREFERENCE PRIMARY DEMONSTRATION   ANSWERED BY patient   RELATIONSHIP SELF       Fall Risk  No flowsheet data found. ADL  No flowsheet data found. Travel Screening:    Travel Screening       Question Response    In the last 10 days, have you been in contact with someone who was confirmed or suspected to have Coronavirus/COVID-19? No / Unsure    Have you had a COVID-19 viral test in the last 10 days? No    Do you have any of the following new or worsening symptoms? None of these    Have you traveled internationally or domestically in the last month? No          Travel History   Travel since 12/10/22    No documented travel since 12/10/22         Health Maintenance reviewed and discussed and ordered per Provider. Health Maintenance Due   Topic Date Due    COVID-19 Vaccine (1) Never done    Pneumococcal 0-64 years (1 - PCV) Never done    Eye Exam Retinal or Dilated  Never done    Hepatitis B Vaccine (1 of 3 - Risk 3-dose series) Never done    DTaP/Tdap/Td series (1 - Tdap) Never done   . Coordination of Care:  1. Have you been to the ER, urgent care clinic since your last visit? Hospitalized since your last visit? no    2. Have you seen or consulted any other health care providers outside of the [address] [place] [address] since your last visit? Include any pap smears or colon screening.  no

## 2023-01-25 ENCOUNTER — VIRTUAL VISIT (OUTPATIENT)
Dept: INTERNAL MEDICINE CLINIC | Age: 33
End: 2023-01-25

## 2023-01-25 DIAGNOSIS — Z79.4 TYPE 2 DIABETES MELLITUS WITH HYPERGLYCEMIA, WITH LONG-TERM CURRENT USE OF INSULIN (HCC): Primary | ICD-10-CM

## 2023-01-25 DIAGNOSIS — E11.65 TYPE 2 DIABETES MELLITUS WITH HYPERGLYCEMIA, WITH LONG-TERM CURRENT USE OF INSULIN (HCC): Primary | ICD-10-CM

## 2023-01-25 NOTE — PROGRESS NOTES
Pharmacy Progress Note - Diabetes Management       Assessment / Plan:   Diabetes Management:  Per ADA guidelines, Pt's A1c is not at goal of < 7%. Unable to have appointment today d/t caregiver not being present. Rescheduled appointment for tomorrow. Patient will have rescheduled appointment on 26 Jan 2023 at 0900. S/O: Mr. Denisse Kahn, a 33 y.o. male referred by DESTINY Arroyo,  has a past medical history of Diabetes ([facility] [address] Hypertension, Paranoid schizophrenia ([facility] [address] and Thyroid disease. [facility]. Patient's last A1c was:   Lab Results   Component Value Date/Time    Hemoglobin A1c 9.5 (H) 12/19/2022 11:06 AM       Interim update:    Pt was last seen by DESTINY Arroyo on 10 Hay 2023. Per her A/P: Home BG readings range from : 222-533  Hypoglycemia: none  On statin: none  Comorbid: HTN, HLD  Followed by endocrine: no  Current treatment: insulin lantus 14 units at bedtime, metformin 1,000 mg BID, insulin humalog sliding scale  He was started on Lipitor 10mg daily d/t pt having diabetes. Today:   Pt states that he is injecting 12 units of the Lantus instead of 14 units and 3 units of Humalog with meals. He states that he gets an extra dose if his BG is over 200mg/dL. However, he then stated that he didn't know what he was injecting because he isn't currently injecting the insulin or performing the fsBG checks. His caregiver injects his insulin and checks his BG. He is amenable to an appointment tomorrow so that his caregiver can be present for the appointment.       Current anti-hyperglycemic regimen includes:    Key Antihyperglycemic Medications               metFORMIN (GLUCOPHAGE) 1,000 mg tablet Take 1 tablet by mouth twice daily    insulin lispro (HUMALOG) 100 unit/mL injection Sliding scale twice a day  Indications: type 2 diabetes mellitus    insulin glargine (LANTUS) 100 unit/mL injection 14 Units by SubCUTAneous route nightly. Indications: type 2 diabetes mellitus          Complete current medication regimen includes:  Current Outpatient Medications   Medication Sig    amLODIPine (NORVASC) 10 mg tablet TAKE 1 TABLET BY MOUTH ONCE DAILY FOR HIGH BLOOD PRESSURE    metFORMIN (GLUCOPHAGE) 1,000 mg tablet Take 1 tablet by mouth twice daily    levothyroxine (SYNTHROID) 75 mcg tablet Take 1 tablet by mouth once daily    lisinopriL (PRINIVIL, ZESTRIL) 10 mg tablet TAKE 1 TABLET BY MOUTH ONCE DAILY FOR HIGH BLOOD PRESSURE    atorvastatin (LIPITOR) 10 mg tablet Take 1 Tablet by mouth daily. benztropine (COGENTIN) 0.5 mg tablet     insulin lispro (HUMALOG) 100 unit/mL injection Sliding scale twice a day  Indications: type 2 diabetes mellitus    insulin glargine (LANTUS) 100 unit/mL injection 14 Units by SubCUTAneous route nightly. Indications: type 2 diabetes mellitus    meclizine (ANTIVERT) 25 mg tablet Take 25 mg by mouth daily. ziprasidone (GEODON) 20 mg capsule Take 80 mg by mouth daily. Takes 20 mg in the AM, and 60 mg in the evening    haloperidoL (HALDOL) 5 mg tablet Take 5 mg by mouth daily. melatonin 5 mg tablet Take 5 mg by mouth nightly. No current facility-administered medications for this visit. Allergies:  No Known Allergies    Future Appointments   Date Time Provider Heriberto Wellington   1/26/2023  9:00 AM Leonard Ruiz [facility] NAN AMB   4/10/2023  9:00 AM DESTINY Esteban [facility]       Patient verbalized understanding of the information presented and all of the patients questions were answered. AVS was gone over with the patient. Patient advised to call the office with any additional questions or concerns. Notifications of recommendations will be sent to DESTINY Esteban for review.       Thank you for the consult,  Guillermo Watts, PharmD, BCACP, BC-ADM        For Pharmacy Admin Tracking Only    Program: Medical Group  CPA in place: Yes  Recommendation Provided To: Patient/Caregiver: 1 via Virtual Visit  Intervention Detail: Scheduled Appointment  Intervention Accepted By: Patient/Caregiver: 1  Gap Closed?: No  Time Spent (min): 20

## 2023-01-25 NOTE — Clinical Note
Caregiver was not present for the visit so unable to properly assess or make changes. Rescheduled appointment for the following day.

## 2023-01-26 ENCOUNTER — TELEPHONE (OUTPATIENT)
Dept: INTERNAL MEDICINE CLINIC | Age: 33
End: 2023-01-26

## 2023-01-26 NOTE — TELEPHONE ENCOUNTER
Pharmacy Progress Note - Telephone Call    Mr. Home Garibay 32 y.o.'s caregiver, Elza Ardon, was contacted via an outbound telephone call today to reschedule their appointment for PharmD diabetes management and education per referral from DESTINY Powell. A voicemail was unable to be left to reschedule the appointment. If pt or caregiver calls back, please suggest an initial 60 minute IN OFFICE visit with the caregiver present.      Thank you,    Carmel Ryan, PharmD, BCACP, BC-ADM

## 2023-02-04 DIAGNOSIS — E11.69 HYPERLIPIDEMIA ASSOCIATED WITH TYPE 2 DIABETES MELLITUS (HCC): Primary | ICD-10-CM

## 2023-02-04 DIAGNOSIS — Z79.4 TYPE 2 DIABETES MELLITUS WITH OTHER SPECIFIED COMPLICATION, WITH LONG-TERM CURRENT USE OF INSULIN (HCC): Primary | ICD-10-CM

## 2023-02-04 DIAGNOSIS — E78.5 HYPERLIPIDEMIA ASSOCIATED WITH TYPE 2 DIABETES MELLITUS (HCC): Primary | ICD-10-CM

## 2023-02-04 DIAGNOSIS — E11.69 TYPE 2 DIABETES MELLITUS WITH OTHER SPECIFIED COMPLICATION, WITH LONG-TERM CURRENT USE OF INSULIN (HCC): Primary | ICD-10-CM

## 2023-02-05 DIAGNOSIS — E78.5 HYPERLIPIDEMIA ASSOCIATED WITH TYPE 2 DIABETES MELLITUS (HCC): Primary | ICD-10-CM

## 2023-02-05 DIAGNOSIS — E11.69 HYPERLIPIDEMIA ASSOCIATED WITH TYPE 2 DIABETES MELLITUS (HCC): Primary | ICD-10-CM

## 2023-06-10 NOTE — ASSESSMENT & PLAN NOTE
Continue current regimen, check TSH and T4 Managing Anxiety, Adult  After being diagnosed with anxiety, you may be relieved to know why you have felt or behaved a certain way. You may also feel overwhelmed about the treatment ahead and what it will mean for your life. With care and support, you can manage this condition.    How to manage lifestyle changes  Managing stress and anxiety    An adult doing mindful breathing while practicing yoga.  Stress is your body's reaction to life changes and events, both good and bad. Most stress will last just a few hours, but stress can be ongoing and can lead to more than just stress. Although stress can play a major role in anxiety, it is not the same as anxiety. Stress is usually caused by something external, such as a deadline, test, or competition. Stress normally passes after the triggering event has ended.    Anxiety is caused by something internal, such as imagining a terrible outcome or worrying that something will go wrong that will devastate you. Anxiety often does not go away even after the triggering event is over, and it can become long-term (chronic) worry. It is important to understand the differences between stress and anxiety and to manage your stress effectively so that it does not lead to an anxious response.    Talk with your health care provider or a counselor to learn more about reducing anxiety and stress. He or she may suggest tension reduction techniques, such as:  Music therapy. Spend time creating or listening to music that you enjoy and that inspires you.  Mindfulness-based meditation. Practice being aware of your normal breaths while not trying to control your breathing. It can be done while sitting or walking.  Centering prayer. This involves focusing on a word, phrase, or sacred image that means something to you and brings you peace.  Deep breathing. To do this, expand your stomach and inhale slowly through your nose. Hold your breath for 3–5 seconds. Then exhale slowly, letting your stomach muscles relax.  Self-talk. Learn to notice and identify thought patterns that lead to anxiety reactions and change those patterns to thoughts that feel peaceful.  Muscle relaxation. Taking time to tense muscles and then relax them.  Choose a tension reduction technique that fits your lifestyle and personality. These techniques take time and practice. Set aside 5–15 minutes a day to do them. Therapists can offer counseling and training in these techniques. The training to help with anxiety may be covered by some insurance plans.    Other things you can do to manage stress and anxiety include:  Keeping a stress diary. This can help you learn what triggers your reaction and then learn ways to manage your response.  Thinking about how you react to certain situations. You may not be able to control everything, but you can control your response.  Making time for activities that help you relax and not feeling guilty about spending your time in this way.  Doing visual imagery. This involves imagining or creating mental pictures to help you relax.  Practicing yoga. Through yoga poses, you can lower tension and promote relaxation.  Medicines    Medicines can help ease symptoms. Medicines for anxiety include:  Antidepressant medicines. These are usually prescribed for long-term daily control.  Anti-anxiety medicines. These may be added in severe cases, especially when panic attacks occur.  Medicines will be prescribed by a health care provider. When used together, medicines, psychotherapy, and tension reduction techniques may be the most effective treatment.    Relationships    Relationships can play a big part in helping you recover. Try to spend more time connecting with trusted friends and family members.  Consider going to couples counseling if you have a partner, taking family education classes, or going to family therapy.  Therapy can help you and others better understand your condition.  How to recognize changes in your anxiety  Everyone responds differently to treatment for anxiety. Recovery from anxiety happens when symptoms decrease and stop interfering with your daily activities at home or work. This may mean that you will start to:  Have better concentration and focus. Worry will interfere less in your daily thinking.  Sleep better.  Be less irritable.  Have more energy.  Have improved memory.  It is also important to recognize when your condition is getting worse. Contact your health care provider if your symptoms interfere with home or work and you feel like your condition is not improving.    Follow these instructions at home:  Activity    Exercise. Adults should do the following:  Exercise for at least 150 minutes each week. The exercise should increase your heart rate and make you sweat (moderate-intensity exercise).  Strengthening exercises at least twice a week.  Get the right amount and quality of sleep. Most adults need 7–9 hours of sleep each night.  Lifestyle    Two adults cook together in a kitchen. Fruit and vegetables are on the counter in front of them.  Eat a healthy diet that includes plenty of vegetables, fruits, whole grains, low-fat dairy products, and lean protein.  Do not eat a lot of foods that are high in fats, added sugars, or salt (sodium).  Make choices that simplify your life.  Do not use any products that contain nicotine or tobacco. These products include cigarettes, chewing tobacco, and vaping devices, such as e-cigarettes. If you need help quitting, ask your health care provider.  Avoid caffeine, alcohol, and certain over-the-counter cold medicines. These may make you feel worse. Ask your pharmacist which medicines to avoid.  General instructions    Take over-the-counter and prescription medicines only as told by your health care provider.  Keep all follow-up visits. This is important.  Where to find support  You can get help and support from these sources:  Self-help groups.  Online and community organizations.  A trusted spiritual leader.  Couples counseling.  Family education classes.  Family therapy.  Where to find more information  You may find that joining a support group helps you deal with your anxiety. The following sources can help you locate counselors or support groups near you:  Mental Health [place]: www.mentalhealth[place].net  Anxiety and Depression Association of [place] (ADAA): www.adaa.org  National [place] [employer]ness (SARAH): www.sarah.org  Contact a health care provider if:  You have a hard time staying focused or finishing daily tasks.  You spend many hours a day feeling worried about everyday life.  You become exhausted by worry.  You start to have headaches or frequently feel tense.  You develop chronic nausea or diarrhea.  Get help right away if:  You have a racing heart and shortness of breath.  You have thoughts of hurting yourself or others.  If you ever feel like you may hurt yourself or others, or have thoughts about taking your own life, get help right away. Go to your nearest emergency department or:  Call your local emergency services (878 in the [place]  Call a suicide crisis helpline, such as the National Suicide Prevention Lifeline at 1-536.310.3369 or 723 in the [place] This is open 24 hours a day in the [place]  Text the Crisis Text Line at 795595 (in the [place]  Summary  Taking steps to learn and use tension reduction techniques can help calm you and help prevent triggering an anxiety reaction.  When used together, medicines, psychotherapy, and tension reduction techniques may be the most effective treatment.  Family, friends, and partners can play a big part in supporting you.  This information is not intended to replace advice given to you by your health care provider. Make sure you discuss any questions you have with your health care provider.    Document Revised: 07/13/2022 Document Reviewed: 04/10/2022

## 2024-01-08 PROBLEM — E11.65 TYPE 2 DIABETES MELLITUS WITH HYPERGLYCEMIA, WITH LONG-TERM CURRENT USE OF INSULIN (HCC): Status: ACTIVE | Noted: 2022-11-11

## 2024-01-08 PROBLEM — Z79.4 TYPE 2 DIABETES MELLITUS WITH HYPERGLYCEMIA, WITH LONG-TERM CURRENT USE OF INSULIN (HCC): Status: ACTIVE | Noted: 2022-11-11

## 2024-01-08 ASSESSMENT — ENCOUNTER SYMPTOMS: SHORTNESS OF BREATH: 0

## 2024-01-08 NOTE — PROGRESS NOTES
Chief Complaint   Patient presents with    Hypertension    Hypothyroidism    Schizophrenia    Diabetes     Assessment & Plan:     1. Essential hypertension  Assessment & Plan:  BP at goal, <130/80, amlodipine 10 mg daily and lisinopril 10 mg daily  Orders:  -     CBC with Auto Differential; Future  -     amLODIPine (NORVASC) 10 MG tablet; Take 1 tablet by mouth daily, Disp-30 tablet, R-0Normal  -     lisinopril (PRINIVIL;ZESTRIL) 10 MG tablet; Take 1 tablet by mouth daily, Disp-30 tablet, R-0Normal  2. Type 2 diabetes mellitus with hyperglycemia, with long-term current use of insulin (HCC)  Assessment & Plan:  Continue current regimen  Advised pt to bring in insulin vials with him to his next appointment, to obtain exact doses and order refills  Advised pt on lifestyle modifications, reinforced low carbohydrate diet and to eliminate soda intake  Referral to pharmacy for assistance with management  Orders:  -     Hemoglobin A1C; Future  -     [facility] - Pharmacist HR Facilities-[facility])  -     metFORMIN (GLUCOPHAGE) 1000 MG tablet; Take 1 tablet by mouth 2 times daily, Disp-60 tablet, R-0Normal  3. Acquired hypothyroidism  Assessment & Plan:  Continue levothyroxine 75 mcg daily  Recheck TSH and T4   Orders:  -     TSH; Future  -     levothyroxine (SYNTHROID) 75 MCG tablet; Take 1 tablet by mouth daily, Disp-30 tablet, R-0Normal  -     T4, Free; Future  4. Hyperlipidemia associated with type 2 diabetes mellitus (HCC)  Assessment & Plan:  LDL goal <70  Advised pt on lifestyle modifications  Recheck cmp and lipid panel   Orders:  -     Comprehensive Metabolic Panel; Future  -     Lipid Panel; Future  5. Schizophrenia, unspecified type (HCC)  Assessment & Plan:  Continue management per psychiatry   6. Screening for HIV (human immunodeficiency virus)    Follow-up and Dispositions    Return in about 6 weeks (around 2/21/2024) for Medicare Wellness Visit,  blood pressure, diabetes, hypothyroidism, lab

## 2024-01-08 NOTE — ASSESSMENT & PLAN NOTE
Continue current regimen  Advised pt to bring in insulin vials with him to his next appointment, to obtain exact doses and order refills  Advised pt on lifestyle modifications, reinforced low carbohydrate diet and to eliminate soda intake  Referral to pharmacy for assistance with management

## 2024-01-10 ENCOUNTER — OFFICE VISIT (OUTPATIENT)
Facility: CLINIC | Age: 34
End: 2024-01-10
Payer: MEDICARE

## 2024-01-10 VITALS
OXYGEN SATURATION: 96 % | WEIGHT: 210 LBS | HEART RATE: 104 BPM | TEMPERATURE: 97.9 F | HEIGHT: 65 IN | BODY MASS INDEX: 34.99 KG/M2 | DIASTOLIC BLOOD PRESSURE: 79 MMHG | SYSTOLIC BLOOD PRESSURE: 129 MMHG | RESPIRATION RATE: 17 BRPM

## 2024-01-10 DIAGNOSIS — I10 ESSENTIAL HYPERTENSION: Primary | ICD-10-CM

## 2024-01-10 DIAGNOSIS — E03.9 ACQUIRED HYPOTHYROIDISM: ICD-10-CM

## 2024-01-10 DIAGNOSIS — F20.9 SCHIZOPHRENIA, UNSPECIFIED TYPE (HCC): ICD-10-CM

## 2024-01-10 DIAGNOSIS — E11.69 HYPERLIPIDEMIA ASSOCIATED WITH TYPE 2 DIABETES MELLITUS (HCC): ICD-10-CM

## 2024-01-10 DIAGNOSIS — Z79.4 TYPE 2 DIABETES MELLITUS WITH HYPERGLYCEMIA, WITH LONG-TERM CURRENT USE OF INSULIN (HCC): ICD-10-CM

## 2024-01-10 DIAGNOSIS — E11.65 TYPE 2 DIABETES MELLITUS WITH HYPERGLYCEMIA, WITH LONG-TERM CURRENT USE OF INSULIN (HCC): ICD-10-CM

## 2024-01-10 DIAGNOSIS — Z11.4 SCREENING FOR HIV (HUMAN IMMUNODEFICIENCY VIRUS): ICD-10-CM

## 2024-01-10 DIAGNOSIS — E78.5 HYPERLIPIDEMIA ASSOCIATED WITH TYPE 2 DIABETES MELLITUS (HCC): ICD-10-CM

## 2024-01-10 PROCEDURE — 4004F PT TOBACCO SCREEN RCVD TLK: CPT

## 2024-01-10 PROCEDURE — 2022F DILAT RTA XM EVC RTNOPTHY: CPT

## 2024-01-10 PROCEDURE — 3078F DIAST BP <80 MM HG: CPT

## 2024-01-10 PROCEDURE — G8427 DOCREV CUR MEDS BY ELIG CLIN: HCPCS

## 2024-01-10 PROCEDURE — 3074F SYST BP LT 130 MM HG: CPT

## 2024-01-10 PROCEDURE — G8484 FLU IMMUNIZE NO ADMIN: HCPCS

## 2024-01-10 PROCEDURE — G8417 CALC BMI ABV UP PARAM F/U: HCPCS

## 2024-01-10 PROCEDURE — 3046F HEMOGLOBIN A1C LEVEL >9.0%: CPT

## 2024-01-10 PROCEDURE — 99215 OFFICE O/P EST HI 40 MIN: CPT

## 2024-01-10 RX ORDER — LEVOTHYROXINE SODIUM 0.07 MG/1
75 TABLET ORAL DAILY
Qty: 30 TABLET | Refills: 0 | Status: SHIPPED | OUTPATIENT
Start: 2024-01-10

## 2024-01-10 RX ORDER — LISINOPRIL 10 MG/1
10 TABLET ORAL DAILY
Qty: 30 TABLET | Refills: 0 | Status: SHIPPED | OUTPATIENT
Start: 2024-01-10

## 2024-01-10 RX ORDER — AMLODIPINE BESYLATE 10 MG/1
10 TABLET ORAL DAILY
Qty: 30 TABLET | Refills: 0 | Status: SHIPPED | OUTPATIENT
Start: 2024-01-10

## 2024-01-10 SDOH — ECONOMIC STABILITY: FOOD INSECURITY: WITHIN THE PAST 12 MONTHS, THE FOOD YOU BOUGHT JUST DIDN'T LAST AND YOU DIDN'T HAVE MONEY TO GET MORE.: NEVER TRUE

## 2024-01-10 SDOH — ECONOMIC STABILITY: FOOD INSECURITY: WITHIN THE PAST 12 MONTHS, YOU WORRIED THAT YOUR FOOD WOULD RUN OUT BEFORE YOU GOT MONEY TO BUY MORE.: NEVER TRUE

## 2024-01-10 SDOH — ECONOMIC STABILITY: HOUSING INSECURITY
IN THE LAST 12 MONTHS, WAS THERE A TIME WHEN YOU DID NOT HAVE A STEADY PLACE TO SLEEP OR SLEPT IN A SHELTER (INCLUDING NOW)?: NO

## 2024-01-10 SDOH — ECONOMIC STABILITY: INCOME INSECURITY: HOW HARD IS IT FOR YOU TO PAY FOR THE VERY BASICS LIKE FOOD, HOUSING, MEDICAL CARE, AND HEATING?: NOT HARD AT ALL

## 2024-01-10 ASSESSMENT — PATIENT HEALTH QUESTIONNAIRE - PHQ9
SUM OF ALL RESPONSES TO PHQ QUESTIONS 1-9: 0
SUM OF ALL RESPONSES TO PHQ QUESTIONS 1-9: 0
SUM OF ALL RESPONSES TO PHQ9 QUESTIONS 1 & 2: 0
SUM OF ALL RESPONSES TO PHQ QUESTIONS 1-9: 0
2. FEELING DOWN, DEPRESSED OR HOPELESS: 0
SUM OF ALL RESPONSES TO PHQ QUESTIONS 1-9: 0
1. LITTLE INTEREST OR PLEASURE IN DOING THINGS: 0

## 2024-01-10 NOTE — PROGRESS NOTES
Reuben Figueroa presents today for   Chief Complaint   Patient presents with    Hypertension    Hypothyroidism    Schizophrenia    Diabetes       Is someone accompanying this pt? No     Is the patient using any DME equipment during OV? No     Depression Screenin/10/2024     3:06 PM 1/10/2023     9:16 AM 2022     8:50 AM   PHQ-9 Questionaire   Little interest or pleasure in doing things 0 0 0   Feeling down, depressed, or hopeless 0 0 0   PHQ-9 Total Score 0 0 0        STEPHEN 7-Anxiety        No data to display                 Learning Assessment:  No question data found.     Fall Risk       No data to display                   Travel Screening:    Travel Screening       Question Response    Have you been in contact with someone who was sick? No / Unsure    Do you have any of the following new or worsening symptoms? None of these    Have you traveled internationally or domestically in the last month? No          Travel History   Travel since 12/10/23    No documented travel since 12/10/23          Health Maintenance reviewed and discussed and ordered per Provider.  Social Determinants of Health     Tobacco Use: High Risk (1/10/2024)    Patient History     Smoking Tobacco Use: Every Day     Smokeless Tobacco Use: Never     Passive Exposure: Not on file   Alcohol Use: Not on file   Financial Resource Strain: Low Risk  (1/10/2024)    Overall Financial Resource Strain (CARDIA)     Difficulty of Paying Living Expenses: Not hard at all   Food Insecurity: No Food Insecurity (1/10/2024)    Hunger Vital Sign     Worried About Running Out of Food in the Last Year: Never true     Ran Out of Food in the Last Year: Never true   Transportation Needs: Unknown (1/10/2024)    PRAPARE - Transportation     Lack of Transportation (Medical): Not on file     Lack of Transportation (Non-Medical): No   Physical Activity: Not on file   Stress: Not on file   Social Connections: Not on file   Intimate Partner Violence: Not on file

## 2024-01-30 NOTE — TELEPHONE ENCOUNTER
LVM to inquire about which medication is needed. Patient is to call back for clarity in order for refill to be processed.

## 2024-02-01 NOTE — TELEPHONE ENCOUNTER
Called patient 4 times, call keep going to . LVM for patient to call office back in regards to medication clarity. Letter generated.

## 2024-02-02 RX ORDER — ATORVASTATIN CALCIUM 10 MG/1
10 TABLET, FILM COATED ORAL DAILY
Qty: 90 TABLET | Refills: 0 | OUTPATIENT
Start: 2024-02-02

## 2024-03-13 ENCOUNTER — TELEPHONE (OUTPATIENT)
Age: 34
End: 2024-03-13

## 2024-03-13 NOTE — TELEPHONE ENCOUNTER
Pharmacy Progress Note - Telephone Call    Mr. Reuben Figueroa 33 y.o. was contacted via an outbound telephone call today to reschedule their missed appointment for PharmD diabetes management and education per referral from Nedra Weldon NP-C.    Please call pt to reschedule.  Please close encounter after scheduled.  If unable to reach pt after 3 documented attempts, please close encounter.    Thank you,    Delfino Villegas, PharmD, BCACP, BC-ADM

## 2024-03-19 NOTE — TELEPHONE ENCOUNTER
Third attempt to reach patient to get scheduled number not in service, no other number on file for patient. Generating letter to be mailed out ot patient today.

## 2024-10-11 ENCOUNTER — TELEPHONE (OUTPATIENT)
Dept: FAMILY MEDICINE CLINIC | Facility: CLINIC | Age: 34
End: 2024-10-11

## 2024-10-11 NOTE — TELEPHONE ENCOUNTER
Patient was a NO SHOW for 3 appointments in a 12 month period and only number on file is not in service (unable to reach to offer appointment for AWV.)     Do you wish to keep patient active at your office?